# Patient Record
Sex: MALE | URBAN - METROPOLITAN AREA
[De-identification: names, ages, dates, MRNs, and addresses within clinical notes are randomized per-mention and may not be internally consistent; named-entity substitution may affect disease eponyms.]

---

## 2020-11-11 ENCOUNTER — APPOINTMENT (RX ONLY)
Dept: URBAN - METROPOLITAN AREA CLINIC 44 | Facility: CLINIC | Age: 65
Setting detail: DERMATOLOGY
End: 2020-11-11

## 2020-11-11 DIAGNOSIS — L20.89 OTHER ATOPIC DERMATITIS: ICD-10-CM | Status: INADEQUATELY CONTROLLED

## 2020-11-11 DIAGNOSIS — L21.8 OTHER SEBORRHEIC DERMATITIS: ICD-10-CM | Status: INADEQUATELY CONTROLLED

## 2020-11-11 PROBLEM — L20.84 INTRINSIC (ALLERGIC) ECZEMA: Status: ACTIVE | Noted: 2020-11-11

## 2020-11-11 PROBLEM — D48.5 NEOPLASM OF UNCERTAIN BEHAVIOR OF SKIN: Status: ACTIVE | Noted: 2020-11-11

## 2020-11-11 PROCEDURE — ? PRESCRIPTION

## 2020-11-11 PROCEDURE — 99203 OFFICE O/P NEW LOW 30 MIN: CPT | Mod: 25

## 2020-11-11 PROCEDURE — ? COUNSELING

## 2020-11-11 PROCEDURE — ? BIOPSY BY SHAVE METHOD

## 2020-11-11 PROCEDURE — ? TREATMENT REGIMEN

## 2020-11-11 PROCEDURE — 11102 TANGNTL BX SKIN SINGLE LES: CPT

## 2020-11-11 PROCEDURE — ? MEDICARE ABN

## 2020-11-11 PROCEDURE — ? FULL BODY SKIN EXAM - DECLINED

## 2020-11-11 RX ORDER — KETOCONAZOLE 20 MG/G
CREAM TOPICAL BID
Qty: 1 | Refills: 3 | Status: ERX | COMMUNITY
Start: 2020-11-11

## 2020-11-11 RX ORDER — KETOCONAZOLE 20 MG/ML
SHAMPOO, SUSPENSION TOPICAL AS DIRECTED
Qty: 1 | Refills: 5 | Status: ERX | COMMUNITY
Start: 2020-11-11

## 2020-11-11 RX ORDER — TRIAMCINOLONE ACETONIDE 1 MG/G
OINTMENT TOPICAL BID
Qty: 1 | Refills: 1 | Status: ERX | COMMUNITY
Start: 2020-11-11

## 2020-11-11 RX ADMIN — TRIAMCINOLONE ACETONIDE: 1 OINTMENT TOPICAL at 00:00

## 2020-11-11 RX ADMIN — KETOCONAZOLE: 20 SHAMPOO, SUSPENSION TOPICAL at 00:00

## 2020-11-11 RX ADMIN — KETOCONAZOLE: 20 CREAM TOPICAL at 00:00

## 2020-11-11 ASSESSMENT — LOCATION DETAILED DESCRIPTION DERM
LOCATION DETAILED: RIGHT MEDIAL FOREHEAD
LOCATION DETAILED: LEFT SUPERIOR MEDIAL UPPER BACK
LOCATION DETAILED: RIGHT MEDIAL FRONTAL SCALP

## 2020-11-11 ASSESSMENT — LOCATION ZONE DERM
LOCATION ZONE: FACE
LOCATION ZONE: SCALP
LOCATION ZONE: TRUNK

## 2020-11-11 ASSESSMENT — LOCATION SIMPLE DESCRIPTION DERM
LOCATION SIMPLE: LEFT UPPER BACK
LOCATION SIMPLE: RIGHT FOREHEAD
LOCATION SIMPLE: RIGHT SCALP

## 2020-11-11 NOTE — PROCEDURE: MEDICARE ABN
Detail Level: Detailed
Payment Option: Option 1: Bill Medicare, await for decision on payment.
Reason?: non-covered service
Procedure (Limit To 20 Characters): biopsy
Reason?: Additional Information

## 2020-11-11 NOTE — PROCEDURE: TREATMENT REGIMEN
Initiate Treatment: Aveeno, Eucerin, and dove unscented products after showering.
Detail Level: Zone

## 2020-11-11 NOTE — PROCEDURE: BIOPSY BY SHAVE METHOD
Body Location Override (Optional - Billing Will Still Be Based On Selected Body Map Location If Applicable): left lateral forehead
Detail Level: Detailed
Depth Of Biopsy: dermis
Was A Bandage Applied: Yes
Size Of Lesion In Cm: 0.8
X Size Of Lesion In Cm: 0
Biopsy Type: H and E
Biopsy Method: Personna blade
Anesthesia Type: 1% lidocaine with 1:100,000 epinephrine and a 1:10 solution of 8.4% sodium bicarbonate
Anesthesia Volume In Cc (Will Not Render If 0): 0.5
Hemostasis: Aluminum Chloride
Wound Care: Petrolatum
Dressing: bandage
Destruction After The Procedure: No
Type Of Destruction Used: Curettage
Curettage Text: The wound bed was treated with curettage after the biopsy was performed.
Cryotherapy Text: The wound bed was treated with cryotherapy after the biopsy was performed.
Electrodesiccation Text: The wound bed was treated with electrodesiccation after the biopsy was performed.
Electrodesiccation And Curettage Text: The wound bed was treated with electrodesiccation and curettage after the biopsy was performed.
Silver Nitrate Text: The wound bed was treated with silver nitrate after the biopsy was performed.
Lab: 6
Lab Facility: [id number]
Triangulation (Location Of Lesion In Relation To Distance From Anatomical Landmarks): 3cm from left lateral eyebrow
Consent: Written consent was obtained and risks were reviewed including but not limited to scarring, infection, bleeding, scabbing, incomplete removal, nerve damage and allergy to anesthesia.
Post-Care Instructions: I reviewed with the patient in detail post-care instructions. Patient is to keep the biopsy site dry overnight, and then apply vaseline daily until healed.
Notification Instructions: Patient will be notified of biopsy results. However, patient instructed to call the office if not contacted within 2 weeks.
Billing Type: Third-Party Bill
Information: Selecting Yes will display possible errors in your note based on the variables you have selected. This validation is only offered as a suggestion for you. PLEASE NOTE THAT THE VALIDATION TEXT WILL BE REMOVED WHEN YOU FINALIZE YOUR NOTE. IF YOU WANT TO FAX A PRELIMINARY NOTE YOU WILL NEED TO TOGGLE THIS TO 'NO' IF YOU DO NOT WANT IT IN YOUR FAXED NOTE.

## 2020-11-11 NOTE — HPI: RASH
What Type Of Note Output Would You Prefer (Optional)?: Bullet Format
Is The Patient Presenting As Previously Scheduled?: Yes
How Severe Is Your Rash?: moderate
Is This A New Presentation, Or A Follow-Up?: Rash
Additional History: Head and shoulders QOD

## 2020-11-19 ENCOUNTER — APPOINTMENT (RX ONLY)
Dept: URBAN - METROPOLITAN AREA CLINIC 45 | Facility: CLINIC | Age: 65
Setting detail: DERMATOLOGY
End: 2020-11-19

## 2020-11-19 PROBLEM — C44.329 SQUAMOUS CELL CARCINOMA OF SKIN OF OTHER PARTS OF FACE: Status: ACTIVE | Noted: 2020-11-19

## 2020-11-19 PROCEDURE — 14040 TIS TRNFR F/C/C/M/N/A/G/H/F: CPT

## 2020-11-19 PROCEDURE — ? PRESCRIPTION

## 2020-11-19 PROCEDURE — 17311 MOHS 1 STAGE H/N/HF/G: CPT

## 2020-11-19 PROCEDURE — ? MOHS SURGERY

## 2020-11-19 RX ORDER — TRAMADOL HYDROCHLORIDE 50 MG/1
TABLET, FILM COATED ORAL PRN
Qty: 7 | Refills: 0 | COMMUNITY
Start: 2020-11-19

## 2020-11-19 RX ADMIN — TRAMADOL HYDROCHLORIDE 1: 50 TABLET, FILM COATED ORAL at 00:00

## 2020-11-19 NOTE — PROCEDURE: MOHS SURGERY
1. Caller Name: Garry                                          Call Back Number: [phone] (home)        Patient approves a detailed voicemail message: N\A    2. SPECIFIC Action To Be Taken: Referral pending, please sign.    3. Diagnosis/Clinical Reason for Request:   B35.1 (ICD-10-CM) - Onychomycosis   L60.0 (ICD-10-CM) - Ingrown toenail     4. Specialty & Provider Name/Lab/Imaging Location: Podiatrist    5. Is appointment scheduled for requested order/referral: no    Patient was informed they will receive a return phone call from the office ONLY if there are any questions before processing their request. Advised to call back if they haven't received a call from the referral department in 5 days.      Last referral was not contracted with his INS needs a new referral    Consent (Scalp)/Introductory Paragraph: The rationale for Mohs was explained to the patient and consent was obtained. The risks, benefits and alternatives to therapy were discussed in detail. Specifically, the risks of changes in hair growth pattern secondary to repair, infection, scarring, bleeding, prolonged wound healing, incomplete removal, allergy to anesthesia, nerve injury and recurrence were addressed. Prior to the procedure, the treatment site was clearly identified and confirmed by the patient. All components of Universal Protocol/PAUSE Rule completed.

## 2021-01-29 ENCOUNTER — APPOINTMENT (RX ONLY)
Dept: URBAN - METROPOLITAN AREA CLINIC 44 | Facility: CLINIC | Age: 66
Setting detail: DERMATOLOGY
End: 2021-01-29

## 2021-01-29 DIAGNOSIS — D22 MELANOCYTIC NEVI: ICD-10-CM | Status: STABLE

## 2021-01-29 DIAGNOSIS — Z85.828 PERSONAL HISTORY OF OTHER MALIGNANT NEOPLASM OF SKIN: ICD-10-CM | Status: STABLE

## 2021-01-29 DIAGNOSIS — L20.89 OTHER ATOPIC DERMATITIS: ICD-10-CM | Status: WELL CONTROLLED

## 2021-01-29 DIAGNOSIS — L82.0 INFLAMED SEBORRHEIC KERATOSIS: ICD-10-CM | Status: INADEQUATELY CONTROLLED

## 2021-01-29 DIAGNOSIS — D18.0 HEMANGIOMA: ICD-10-CM | Status: STABLE

## 2021-01-29 DIAGNOSIS — L57.0 ACTINIC KERATOSIS: ICD-10-CM | Status: INADEQUATELY CONTROLLED

## 2021-01-29 DIAGNOSIS — L21.8 OTHER SEBORRHEIC DERMATITIS: ICD-10-CM | Status: WELL CONTROLLED

## 2021-01-29 DIAGNOSIS — L81.4 OTHER MELANIN HYPERPIGMENTATION: ICD-10-CM | Status: STABLE

## 2021-01-29 DIAGNOSIS — L82.1 OTHER SEBORRHEIC KERATOSIS: ICD-10-CM | Status: STABLE

## 2021-01-29 PROBLEM — D22.9 MELANOCYTIC NEVI, UNSPECIFIED: Status: ACTIVE | Noted: 2021-01-29

## 2021-01-29 PROBLEM — L20.84 INTRINSIC (ALLERGIC) ECZEMA: Status: ACTIVE | Noted: 2021-01-29

## 2021-01-29 PROBLEM — D18.01 HEMANGIOMA OF SKIN AND SUBCUTANEOUS TISSUE: Status: ACTIVE | Noted: 2021-01-29

## 2021-01-29 PROCEDURE — 99214 OFFICE O/P EST MOD 30 MIN: CPT | Mod: 24,25

## 2021-01-29 PROCEDURE — ? SUNSCREEN RECOMMENDATIONS

## 2021-01-29 PROCEDURE — 17110 DESTRUCTION B9 LES UP TO 14: CPT | Mod: 79

## 2021-01-29 PROCEDURE — ? FULL BODY SKIN EXAM

## 2021-01-29 PROCEDURE — ? LIQUID NITROGEN

## 2021-01-29 PROCEDURE — 17003 DESTRUCT PREMALG LES 2-14: CPT | Mod: 59,79

## 2021-01-29 PROCEDURE — ? PRESCRIPTION MEDICATION MANAGEMENT

## 2021-01-29 PROCEDURE — ? ADDITIONAL NOTES

## 2021-01-29 PROCEDURE — ? COUNSELING

## 2021-01-29 PROCEDURE — ? MEDICATION COUNSELING

## 2021-01-29 PROCEDURE — 17000 DESTRUCT PREMALG LESION: CPT | Mod: 59,79

## 2021-01-29 ASSESSMENT — LOCATION DETAILED DESCRIPTION DERM
LOCATION DETAILED: RIGHT SUPERIOR FOREHEAD
LOCATION DETAILED: LEFT SUPERIOR MEDIAL UPPER BACK
LOCATION DETAILED: RIGHT SUPERIOR PARIETAL SCALP
LOCATION DETAILED: RIGHT MEDIAL FRONTAL SCALP
LOCATION DETAILED: INFERIOR MID FOREHEAD
LOCATION DETAILED: LEFT CENTRAL EYEBROW
LOCATION DETAILED: LEFT SUPERIOR PARIETAL SCALP
LOCATION DETAILED: RIGHT MEDIAL FOREHEAD
LOCATION DETAILED: LEFT SUPERIOR UPPER BACK
LOCATION DETAILED: RIGHT INFERIOR UPPER BACK
LOCATION DETAILED: LEFT CENTRAL MALAR CHEEK

## 2021-01-29 ASSESSMENT — LOCATION ZONE DERM
LOCATION ZONE: SCALP
LOCATION ZONE: TRUNK
LOCATION ZONE: FACE

## 2021-01-29 ASSESSMENT — LOCATION SIMPLE DESCRIPTION DERM
LOCATION SIMPLE: LEFT UPPER BACK
LOCATION SIMPLE: RIGHT FOREHEAD
LOCATION SIMPLE: INFERIOR FOREHEAD
LOCATION SIMPLE: LEFT CHEEK
LOCATION SIMPLE: LEFT EYEBROW
LOCATION SIMPLE: SCALP
LOCATION SIMPLE: RIGHT SCALP
LOCATION SIMPLE: RIGHT UPPER BACK

## 2021-01-29 NOTE — PROCEDURE: MEDICATION COUNSELING
Xelgetachewz Pregnancy And Lactation Text: This medication is Pregnancy Category D and is not considered safe during pregnancy.  The risk during breast feeding is also uncertain.

## 2021-01-29 NOTE — PROCEDURE: MEDICATION COUNSELING
Lot # (Optional): [id number] Lot # (Optional): [id number] Imiquimod Counseling:  I discussed with the patient the risks of imiquimod including but not limited to erythema, scaling, itching, weeping, crusting, and pain.  Patient understands that the inflammatory response to imiquimod is variable from person to person and was educated regarded proper titration schedule.  If flu-like symptoms develop, patient knows to discontinue the medication and contact us.

## 2021-01-29 NOTE — PROCEDURE: LIQUID NITROGEN
Consent: The patient's consent was obtained including but not limited to risks of crusting, scabbing, blistering, scarring, darker or lighter pigmentary change, recurrence, incomplete removal and infection.
Detail Level: Zone
Render Post-Care Instructions In Note?: no
Post-Care Instructions: I reviewed with the patient in detail post-care instructions. Patient is to wear sunprotection, and avoid picking at any of the treated lesions. Pt may apply Vaseline to crusted or scabbing areas.
Duration Of Freeze Thaw-Cycle (Seconds): 0
Medical Necessity Information: It is in your best interest to select a reason for this procedure from the list below. All of these items fulfill various CMS LCD requirements except the new and changing color options.
Medical Necessity Clause: This procedure was medically necessary because the lesions that were treated were:

## 2021-01-29 NOTE — PROCEDURE: PRESCRIPTION MEDICATION MANAGEMENT
Render In Strict Bullet Format?: No
Detail Level: Zone
Continue Regimen: Ketoconazole shampoo (apply to scalp 2-3 x weekly) and Ketoconazole cream 2% (apply twice daily to face then prn)
Continue Regimen: Triamcinolone .1% ointment (apply bid x 2 weeks then prn)

## 2021-02-11 ENCOUNTER — RX ONLY (OUTPATIENT)
Age: 66
Setting detail: RX ONLY
End: 2021-02-11

## 2021-02-11 RX ORDER — AMMONIUM LACTATE 12 G/100G
LOTION TOPICAL QD
Qty: 1 | Refills: 2 | Status: ERX | COMMUNITY
Start: 2021-02-11

## 2021-07-30 ENCOUNTER — RX ONLY (OUTPATIENT)
Age: 66
Setting detail: RX ONLY
End: 2021-07-30

## 2021-07-30 ENCOUNTER — APPOINTMENT (RX ONLY)
Dept: URBAN - METROPOLITAN AREA CLINIC 44 | Facility: CLINIC | Age: 66
Setting detail: DERMATOLOGY
End: 2021-07-30

## 2021-07-30 DIAGNOSIS — L82.1 OTHER SEBORRHEIC KERATOSIS: ICD-10-CM

## 2021-07-30 DIAGNOSIS — B35.3 TINEA PEDIS: ICD-10-CM | Status: INADEQUATELY CONTROLLED

## 2021-07-30 DIAGNOSIS — Z85.828 PERSONAL HISTORY OF OTHER MALIGNANT NEOPLASM OF SKIN: ICD-10-CM

## 2021-07-30 DIAGNOSIS — D18.0 HEMANGIOMA: ICD-10-CM

## 2021-07-30 DIAGNOSIS — L57.0 ACTINIC KERATOSIS: ICD-10-CM

## 2021-07-30 DIAGNOSIS — L82.0 INFLAMED SEBORRHEIC KERATOSIS: ICD-10-CM

## 2021-07-30 DIAGNOSIS — D22 MELANOCYTIC NEVI: ICD-10-CM

## 2021-07-30 DIAGNOSIS — L81.4 OTHER MELANIN HYPERPIGMENTATION: ICD-10-CM

## 2021-07-30 PROBLEM — D22.9 MELANOCYTIC NEVI, UNSPECIFIED: Status: ACTIVE | Noted: 2021-07-30

## 2021-07-30 PROBLEM — D18.01 HEMANGIOMA OF SKIN AND SUBCUTANEOUS TISSUE: Status: ACTIVE | Noted: 2021-07-30

## 2021-07-30 PROCEDURE — ? PRESCRIPTION MEDICATION MANAGEMENT

## 2021-07-30 PROCEDURE — 17000 DESTRUCT PREMALG LESION: CPT | Mod: 59

## 2021-07-30 PROCEDURE — 17003 DESTRUCT PREMALG LES 2-14: CPT | Mod: 59

## 2021-07-30 PROCEDURE — ? LIQUID NITROGEN

## 2021-07-30 PROCEDURE — ? FULL BODY SKIN EXAM

## 2021-07-30 PROCEDURE — 99214 OFFICE O/P EST MOD 30 MIN: CPT | Mod: 25

## 2021-07-30 PROCEDURE — 17110 DESTRUCTION B9 LES UP TO 14: CPT

## 2021-07-30 PROCEDURE — ? COUNSELING

## 2021-07-30 PROCEDURE — ? ADDITIONAL NOTES

## 2021-07-30 PROCEDURE — ? SUNSCREEN RECOMMENDATIONS

## 2021-07-30 RX ORDER — KETOCONAZOLE 20 MG/ML
SHAMPOO, SUSPENSION TOPICAL AS DIRECTED
Qty: 1 | Refills: 5 | Status: ERX | COMMUNITY
Start: 2021-07-30

## 2021-07-30 RX ORDER — KETOCONAZOLE 20 MG/G
CREAM TOPICAL BID
Qty: 1 | Refills: 3 | Status: ERX | COMMUNITY
Start: 2021-07-30

## 2021-07-30 RX ORDER — AMMONIUM LACTATE 12 G/100G
LOTION TOPICAL QD
Qty: 1 | Refills: 2 | Status: ERX | COMMUNITY
Start: 2021-07-30

## 2021-07-30 RX ORDER — TRIAMCINOLONE ACETONIDE 1 MG/G
OINTMENT TOPICAL BID
Qty: 1 | Refills: 1 | Status: ERX | COMMUNITY
Start: 2021-07-30

## 2021-07-30 ASSESSMENT — LOCATION DETAILED DESCRIPTION DERM
LOCATION DETAILED: LEFT MEDIAL 5TH TOE
LOCATION DETAILED: POSTERIOR MID-PARIETAL SCALP
LOCATION DETAILED: RIGHT MEDIAL FOREHEAD
LOCATION DETAILED: MID-FRONTAL SCALP
LOCATION DETAILED: SUPERIOR MID FOREHEAD
LOCATION DETAILED: LEFT SUPERIOR OCCIPITAL SCALP
LOCATION DETAILED: 3RD WEBSPACE LEFT FOOT
LOCATION DETAILED: RIGHT SUPERIOR PARIETAL SCALP

## 2021-07-30 ASSESSMENT — LOCATION ZONE DERM
LOCATION ZONE: FACE
LOCATION ZONE: TOE
LOCATION ZONE: SCALP
LOCATION ZONE: FEET

## 2021-07-30 ASSESSMENT — LOCATION SIMPLE DESCRIPTION DERM
LOCATION SIMPLE: POSTERIOR SCALP
LOCATION SIMPLE: LEFT 5TH TOE
LOCATION SIMPLE: SUPERIOR FOREHEAD
LOCATION SIMPLE: LEFT OCCIPITAL SCALP
LOCATION SIMPLE: ANTERIOR SCALP
LOCATION SIMPLE: RIGHT FOREHEAD
LOCATION SIMPLE: SCALP
LOCATION SIMPLE: LEFT FOOT

## 2021-07-30 NOTE — PROCEDURE: PRESCRIPTION MEDICATION MANAGEMENT
Initiate Treatment: Zeasorb AF OTC to affected area
Detail Level: Zone
Render In Strict Bullet Format?: No

## 2021-07-30 NOTE — PROCEDURE: LIQUID NITROGEN
Duration Of Freeze Thaw-Cycle (Seconds): 0
Post-Care Instructions: I reviewed with the patient in detail post-care instructions. Patient is to wear sunprotection, and avoid picking at any of the treated lesions. Pt may apply Vaseline to crusted or scabbing areas.
Render Note In Bullet Format When Appropriate: No
Show Applicator Variable?: Yes
Consent: The patient's consent was obtained including but not limited to risks of crusting, scabbing, blistering, scarring, darker or lighter pigmentary change, recurrence, incomplete removal and infection.
Detail Level: Detailed
Medical Necessity Clause: This procedure was medically necessary because the lesions that were treated were:
Detail Level: Zone
Medical Necessity Information: It is in your best interest to select a reason for this procedure from the list below. All of these items fulfill various CMS LCD requirements except the new and changing color options.

## 2021-12-15 ENCOUNTER — APPOINTMENT (RX ONLY)
Dept: URBAN - METROPOLITAN AREA CLINIC 44 | Facility: CLINIC | Age: 66
Setting detail: DERMATOLOGY
End: 2021-12-15

## 2021-12-15 PROCEDURE — ? REASON FOR LEAVING

## 2022-01-05 ENCOUNTER — APPOINTMENT (RX ONLY)
Dept: URBAN - METROPOLITAN AREA CLINIC 44 | Facility: CLINIC | Age: 67
Setting detail: DERMATOLOGY
End: 2022-01-05

## 2022-01-05 DIAGNOSIS — D22 MELANOCYTIC NEVI: ICD-10-CM

## 2022-01-05 DIAGNOSIS — L57.0 ACTINIC KERATOSIS: ICD-10-CM | Status: INADEQUATELY CONTROLLED

## 2022-01-05 DIAGNOSIS — L82.1 OTHER SEBORRHEIC KERATOSIS: ICD-10-CM

## 2022-01-05 DIAGNOSIS — L81.4 OTHER MELANIN HYPERPIGMENTATION: ICD-10-CM

## 2022-01-05 DIAGNOSIS — D18.0 HEMANGIOMA: ICD-10-CM

## 2022-01-05 DIAGNOSIS — I78.1 NEVUS, NON-NEOPLASTIC: ICD-10-CM

## 2022-01-05 DIAGNOSIS — B35.3 TINEA PEDIS: ICD-10-CM

## 2022-01-05 DIAGNOSIS — Z80.8 FAMILY HISTORY OF MALIGNANT NEOPLASM OF OTHER ORGANS OR SYSTEMS: ICD-10-CM

## 2022-01-05 PROBLEM — D18.01 HEMANGIOMA OF SKIN AND SUBCUTANEOUS TISSUE: Status: ACTIVE | Noted: 2022-01-05

## 2022-01-05 PROBLEM — D22.5 MELANOCYTIC NEVI OF TRUNK: Status: ACTIVE | Noted: 2022-01-05

## 2022-01-05 PROCEDURE — 99214 OFFICE O/P EST MOD 30 MIN: CPT

## 2022-01-05 PROCEDURE — ? COUNSELING

## 2022-01-05 PROCEDURE — ? MEDICATION COUNSELING

## 2022-01-05 PROCEDURE — ? PRESCRIPTION MEDICATION MANAGEMENT

## 2022-01-05 PROCEDURE — ? ADDITIONAL NOTES

## 2022-01-05 PROCEDURE — ? PRESCRIPTION

## 2022-01-05 RX ORDER — FLUOROURACIL 5 MG/G
CREAM TOPICAL BID
Qty: 40 | Refills: 0 | Status: ERX | COMMUNITY
Start: 2022-01-05

## 2022-01-05 RX ADMIN — FLUOROURACIL: 5 CREAM TOPICAL at 00:00

## 2022-01-05 ASSESSMENT — LOCATION ZONE DERM
LOCATION ZONE: TOE
LOCATION ZONE: TRUNK
LOCATION ZONE: FEET
LOCATION ZONE: SCALP
LOCATION ZONE: FACE

## 2022-01-05 ASSESSMENT — LOCATION DETAILED DESCRIPTION DERM
LOCATION DETAILED: RIGHT DORSAL 4TH TOE
LOCATION DETAILED: 3RD WEBSPACE LEFT FOOT
LOCATION DETAILED: LEFT MEDIAL UPPER BACK
LOCATION DETAILED: MIDDLE STERNUM
LOCATION DETAILED: LEFT MEDIAL 5TH TOE
LOCATION DETAILED: RIGHT SUPERIOR FOREHEAD
LOCATION DETAILED: RIGHT MEDIAL 4TH TOE
LOCATION DETAILED: POSTERIOR MID-PARIETAL SCALP
LOCATION DETAILED: RIGHT MEDIAL SUPERIOR CHEST
LOCATION DETAILED: INFERIOR THORACIC SPINE

## 2022-01-05 ASSESSMENT — LOCATION SIMPLE DESCRIPTION DERM
LOCATION SIMPLE: CHEST
LOCATION SIMPLE: LEFT 5TH TOE
LOCATION SIMPLE: UPPER BACK
LOCATION SIMPLE: POSTERIOR SCALP
LOCATION SIMPLE: LEFT FOOT
LOCATION SIMPLE: RIGHT FOREHEAD
LOCATION SIMPLE: LEFT UPPER BACK
LOCATION SIMPLE: RIGHT 4TH TOE

## 2022-01-05 NOTE — PROCEDURE: MIPS QUALITY
Quality 226: Preventive Care And Screening: Tobacco Use: Screening And Cessation Intervention: Patient screened for tobacco use and is an ex/non-smoker
Quality 130: Documentation Of Current Medications In The Medical Record: Current Medications Documented
Quality 431: Preventive Care And Screening: Unhealthy Alcohol Use - Screening: Patient not identified as an unhealthy alcohol user when screened for unhealthy alcohol use using a systematic screening method
Detail Level: Detailed
Quality 137: Melanoma: Continuity Of Care - Recall System: Patient information entered into a recall system that includes: target date for the next exam specified AND a process to follow up with patients regarding missed or unscheduled appointments

## 2022-01-05 NOTE — PROCEDURE: PRESCRIPTION MEDICATION MANAGEMENT
Continue Regimen: Zeasorb AF OTC to affected area\\nKeep feet as dry as possible, dry on cool setting with hair dryer if needed
Detail Level: Zone
Render In Strict Bullet Format?: No
Initiate Treatment: 5FU aaa on scalp bid x 2-3 weeks. F/u 1 month after treatment

## 2022-01-05 NOTE — PROCEDURE: ADDITIONAL NOTES
Additional Notes: Patient consent was obtained to proceed with the visit and recommended plan of care after discussion of all risks and benefits, including the risks of COVID-19 exposure.
Detail Level: Simple
Render Risk Assessment In Note?: no
Additional Notes: Can be treated cosmetically in law in [place]

## 2022-01-05 NOTE — HPI: EVALUATION OF SKIN LESION(S)
What Type Of Note Output Would You Prefer (Optional)?: Bullet Format
Hpi Title: Evaluation of Skin Lesions
How Severe Are Your Spot(S)?: mild
Have Your Spot(S) Been Treated In The Past?: has not been treated
Location: Right shoulder
Year Removed: 2007
Additional History: EST Pmm- pt would like to be seen sooner \\nFse Concerns on the scalp

## 2022-03-16 ENCOUNTER — APPOINTMENT (RX ONLY)
Dept: URBAN - METROPOLITAN AREA CLINIC 44 | Facility: CLINIC | Age: 67
Setting detail: DERMATOLOGY
End: 2022-03-16

## 2022-03-16 DIAGNOSIS — L57.0 ACTINIC KERATOSIS: ICD-10-CM

## 2022-03-16 DIAGNOSIS — L20.89 OTHER ATOPIC DERMATITIS: ICD-10-CM | Status: INADEQUATELY CONTROLLED

## 2022-03-16 PROBLEM — L20.84 INTRINSIC (ALLERGIC) ECZEMA: Status: ACTIVE | Noted: 2022-03-16

## 2022-03-16 PROCEDURE — ? PRESCRIPTION

## 2022-03-16 PROCEDURE — ? FULL BODY SKIN EXAM - DECLINED

## 2022-03-16 PROCEDURE — ? PRESCRIPTION MEDICATION MANAGEMENT

## 2022-03-16 PROCEDURE — ? ADDITIONAL NOTES

## 2022-03-16 PROCEDURE — ? MEDICATION COUNSELING

## 2022-03-16 PROCEDURE — 99214 OFFICE O/P EST MOD 30 MIN: CPT

## 2022-03-16 PROCEDURE — ? COUNSELING

## 2022-03-16 RX ORDER — TRIAMCINOLONE ACETONIDE 1 MG/G
OINTMENT TOPICAL BID
Qty: 80 | Refills: 1 | Status: ERX

## 2022-03-16 ASSESSMENT — LOCATION DETAILED DESCRIPTION DERM
LOCATION DETAILED: LEFT VENTRAL PROXIMAL FOREARM
LOCATION DETAILED: RIGHT DISTAL POSTERIOR UPPER ARM
LOCATION DETAILED: POSTERIOR MID-PARIETAL SCALP

## 2022-03-16 ASSESSMENT — LOCATION ZONE DERM
LOCATION ZONE: SCALP
LOCATION ZONE: ARM

## 2022-03-16 ASSESSMENT — LOCATION SIMPLE DESCRIPTION DERM
LOCATION SIMPLE: POSTERIOR SCALP
LOCATION SIMPLE: LEFT FOREARM
LOCATION SIMPLE: RIGHT UPPER ARM

## 2022-03-16 NOTE — PROCEDURE: ADDITIONAL NOTES
Additional Notes: Patient consent was obtained to proceed with the visit and recommended plan of care after discussion of all risks and benefits, including the risks of COVID-19 exposure.
Detail Level: Simple
Additional Notes: Previous lesions are crusted over and healing. Pt will continue 5fu prn
Render Risk Assessment In Note?: no

## 2022-03-16 NOTE — PROCEDURE: PRESCRIPTION MEDICATION MANAGEMENT
Detail Level: Zone
Continue Regimen: Triamcinolone ointment bid x 2weeks than prn flares
Render In Strict Bullet Format?: No

## 2022-07-06 ENCOUNTER — APPOINTMENT (RX ONLY)
Dept: URBAN - METROPOLITAN AREA CLINIC 44 | Facility: CLINIC | Age: 67
Setting detail: DERMATOLOGY
End: 2022-07-06

## 2022-07-06 DIAGNOSIS — D22 MELANOCYTIC NEVI: ICD-10-CM

## 2022-07-06 DIAGNOSIS — L81.4 OTHER MELANIN HYPERPIGMENTATION: ICD-10-CM

## 2022-07-06 DIAGNOSIS — L82.1 OTHER SEBORRHEIC KERATOSIS: ICD-10-CM

## 2022-07-06 DIAGNOSIS — D18.0 HEMANGIOMA: ICD-10-CM

## 2022-07-06 DIAGNOSIS — Z85.828 PERSONAL HISTORY OF OTHER MALIGNANT NEOPLASM OF SKIN: ICD-10-CM

## 2022-07-06 DIAGNOSIS — D485 NEOPLASM OF UNCERTAIN BEHAVIOR OF SKIN: ICD-10-CM | Status: INADEQUATELY CONTROLLED

## 2022-07-06 PROBLEM — D22.61 MELANOCYTIC NEVI OF RIGHT UPPER LIMB, INCLUDING SHOULDER: Status: ACTIVE | Noted: 2022-07-06

## 2022-07-06 PROBLEM — D48.5 NEOPLASM OF UNCERTAIN BEHAVIOR OF SKIN: Status: ACTIVE | Noted: 2022-07-06

## 2022-07-06 PROBLEM — D22.62 MELANOCYTIC NEVI OF LEFT UPPER LIMB, INCLUDING SHOULDER: Status: ACTIVE | Noted: 2022-07-06

## 2022-07-06 PROBLEM — D18.01 HEMANGIOMA OF SKIN AND SUBCUTANEOUS TISSUE: Status: ACTIVE | Noted: 2022-07-06

## 2022-07-06 PROCEDURE — 99213 OFFICE O/P EST LOW 20 MIN: CPT | Mod: 25

## 2022-07-06 PROCEDURE — ? BIOPSY BY SHAVE METHOD

## 2022-07-06 PROCEDURE — ? SUNSCREEN RECOMMENDATIONS

## 2022-07-06 PROCEDURE — ? COUNSELING

## 2022-07-06 PROCEDURE — 11102 TANGNTL BX SKIN SINGLE LES: CPT

## 2022-07-06 ASSESSMENT — LOCATION DETAILED DESCRIPTION DERM
LOCATION DETAILED: RIGHT SUPERIOR UPPER BACK
LOCATION DETAILED: RIGHT INFERIOR ANTERIOR NECK
LOCATION DETAILED: RIGHT MEDIAL UPPER BACK
LOCATION DETAILED: LEFT INFERIOR LATERAL NECK
LOCATION DETAILED: LEFT ANTERIOR DISTAL UPPER ARM
LOCATION DETAILED: LEFT VENTRAL DISTAL FOREARM
LOCATION DETAILED: RIGHT VENTRAL PROXIMAL FOREARM

## 2022-07-06 ASSESSMENT — LOCATION SIMPLE DESCRIPTION DERM
LOCATION SIMPLE: LEFT UPPER ARM
LOCATION SIMPLE: RIGHT ANTERIOR NECK
LOCATION SIMPLE: LEFT FOREARM
LOCATION SIMPLE: LEFT ANTERIOR NECK
LOCATION SIMPLE: RIGHT FOREARM
LOCATION SIMPLE: RIGHT UPPER BACK

## 2022-07-06 ASSESSMENT — LOCATION ZONE DERM
LOCATION ZONE: ARM
LOCATION ZONE: NECK
LOCATION ZONE: TRUNK

## 2022-07-06 NOTE — HPI: EVALUATION OF SKIN LESION(S)
What Type Of Note Output Would You Prefer (Optional)?: Bullet Format
Hpi Title: Evaluation of Skin Lesions
How Severe Are Your Spot(S)?: mild
Have Your Spot(S) Been Treated In The Past?: has not been treated
Additional History: Pt has a skin lesion on his right arm he needs biopsied. Mentioned that his hematologist wants it biopsied , since pt is concerned about it. \\nLast seen on 03/22 by PMM

## 2022-07-06 NOTE — PROCEDURE: BIOPSY BY SHAVE METHOD
Detail Level: Detailed
Depth Of Biopsy: dermis
Was A Bandage Applied: Yes
Size Of Lesion In Cm: 0
Biopsy Type: H and E
Biopsy Method: Personna blade
Anesthesia Type: 1% lidocaine with 1:100,000 epinephrine and a 1:10 solution of 8.4% sodium bicarbonate
Anesthesia Volume In Cc (Will Not Render If 0): 0.5
Hemostasis: Aluminum Chloride
Wound Care: Petrolatum
Dressing: bandage
Destruction After The Procedure: No
Type Of Destruction Used: Curettage
Curettage Text: The wound bed was treated with curettage after the biopsy was performed.
Cryotherapy Text: The wound bed was treated with cryotherapy after the biopsy was performed.
Electrodesiccation Text: The wound bed was treated with electrodesiccation after the biopsy was performed.
Electrodesiccation And Curettage Text: The wound bed was treated with electrodesiccation and curettage after the biopsy was performed.
Silver Nitrate Text: The wound bed was treated with silver nitrate after the biopsy was performed.
Path Notes (To The Dermatopathologist): Currently being seen by hematologist, to get diagnosed
Consent: Written consent was obtained and risks were reviewed including but not limited to scarring, infection, bleeding, scabbing, incomplete removal, nerve damage and allergy to anesthesia.
Post-Care Instructions: I reviewed with the patient in detail post-care instructions. Patient is to keep the biopsy site dry overnight, and then apply vaseline daily until healed.
Notification Instructions: Patient will be notified of biopsy results. However, patient instructed to call the office if not contacted within 2 weeks.
Billing Type: Third-Party Bill
Information: Selecting Yes will display possible errors in your note based on the variables you have selected. This validation is only offered as a suggestion for you. PLEASE NOTE THAT THE VALIDATION TEXT WILL BE REMOVED WHEN YOU FINALIZE YOUR NOTE. IF YOU WANT TO FAX A PRELIMINARY NOTE YOU WILL NEED TO TOGGLE THIS TO 'NO' IF YOU DO NOT WANT IT IN YOUR FAXED NOTE.

## 2022-11-02 NOTE — PROCEDURE: MEDICATION COUNSELING
That is fine if she is feeling better.    Doxycycline Counseling:  Patient counseled regarding possible photosensitivity and increased risk for sunburn.  Patient instructed to avoid sunlight, if possible.  When exposed to sunlight, patients should wear protective clothing, sunglasses, and sunscreen.  The patient was instructed to call the office immediately if the following severe adverse effects occur:  hearing changes, easy bruising/bleeding, severe headache, or vision changes.  The patient verbalized understanding of the proper use and possible adverse effects of doxycycline.  All of the patient's questions and concerns were addressed.

## 2023-01-30 NOTE — PROCEDURE: SUNSCREEN RECOMMENDATIONS
Detail Level: Zone
Products Recommended: Products containing zinc, Elta MD products (available in office)
General Sunscreen Counseling: I recommended a broad spectrum sunscreen with a SPF of 30 or higher.  I explained that SPF 30 sunscreens block approximately 97 percent of the sun's harmful rays.  Sunscreens should be applied at least 15 minutes prior to expected sun exposure and then every 2 hours after that as long as sun exposure continues. If swimming or exercising sunscreen should be reapplied every 45 minutes to an hour after getting wet or sweating.  One ounce, or the equivalent of a shot glass full of sunscreen, is adequate to protect the skin not covered by a bathing suit. I also recommended a lip balm with a sunscreen as well. Sun protective clothing can be used in lieu of sunscreen but must be worn the entire time you are exposed to the sun's rays.
Primary Defect Length In Cm (Final Defect Size - Required For Flaps/Grafts): 4.7

## 2023-05-02 NOTE — PROCEDURE: MEDICATION COUNSELING
Topical Sulfur Applications Pregnancy And Lactation Text: This medication is Pregnancy Category C and has an unknown safety profile during pregnancy. It is unknown if this topical medication is excreted in breast milk. No
